# Patient Record
Sex: FEMALE | Race: ASIAN | NOT HISPANIC OR LATINO
[De-identification: names, ages, dates, MRNs, and addresses within clinical notes are randomized per-mention and may not be internally consistent; named-entity substitution may affect disease eponyms.]

---

## 2022-10-18 PROBLEM — Z00.00 ENCOUNTER FOR PREVENTIVE HEALTH EXAMINATION: Status: ACTIVE | Noted: 2022-10-18

## 2022-12-19 ENCOUNTER — NON-APPOINTMENT (OUTPATIENT)
Age: 47
End: 2022-12-19

## 2022-12-19 ENCOUNTER — APPOINTMENT (OUTPATIENT)
Dept: COLORECTAL SURGERY | Facility: CLINIC | Age: 47
End: 2022-12-19

## 2022-12-19 VITALS
RESPIRATION RATE: 15 BRPM | DIASTOLIC BLOOD PRESSURE: 78 MMHG | WEIGHT: 150 LBS | HEART RATE: 81 BPM | SYSTOLIC BLOOD PRESSURE: 118 MMHG | BODY MASS INDEX: 27.6 KG/M2 | TEMPERATURE: 97.3 F | HEIGHT: 62 IN

## 2022-12-19 DIAGNOSIS — K64.4 RESIDUAL HEMORRHOIDAL SKIN TAGS: ICD-10-CM

## 2022-12-19 PROCEDURE — 46600 DIAGNOSTIC ANOSCOPY SPX: CPT

## 2022-12-19 PROCEDURE — 99203 OFFICE O/P NEW LOW 30 MIN: CPT | Mod: 25

## 2022-12-19 NOTE — ASSESSMENT
[FreeTextEntry1] : 47 year old female who presents for evaluation of hemorrhoids. Patient is asymptomatic with no significant findings on exam. Patient knows to call should hemorrhoid(s) become bothersome/symptomatic, she will otherwise follow up as needed.

## 2022-12-19 NOTE — HISTORY OF PRESENT ILLNESS
[FreeTextEntry1] : 47 year old female who presents on referral for evaluation of hemorrhoids. Patient reports having a routine screening colonoscopy 1 month ago and was told she had hemorrhoids. She denies ever having symptoms, denies bright red blood per rectum, denies changes in stool size/caliber, denies any swollen or tender hemorrhoids.

## 2022-12-19 NOTE — PHYSICAL EXAM
[Excoriation] : no perianal excoriation [Fistula] : no fistulas [Wart] : no warts [Tender, Swollen] : nontender, non-swollen [Normal] : was normal [None] : there was no rectal mass  [Alert] : alert [Oriented to Person] : oriented to person [Oriented to Place] : oriented to place [Oriented to Time] : oriented to time [Calm] : calm [de-identified] : ROMEL: Nontender, no gross blood, no enlarged hemorrhoids [de-identified] : Small left external hemorrhoid [de-identified] : NAD [de-identified] : Nonlabored [de-identified] : Normal rate

## 2022-12-19 NOTE — PROCEDURE
[FreeTextEntry1] : Anoscopy\par \par I discussed the reason for the procedure, and the risks and benefits with the patient. Risks including bleeding and discomfort were discussed. The patient understood or discussion and would like to proceed with the procedure.\par \par After completing a digital rectal exam a well lubricated anoscope was gently inserted into the anus. Complete inspection was performed. No tenderness on insertion of the anoscope, and the procedure was tolerated well. No fissures, fistula openings, enlarged hemorrhoids or masses were identified.\par

## 2024-11-04 ENCOUNTER — OFFICE (OUTPATIENT)
Dept: URBAN - METROPOLITAN AREA CLINIC 12 | Facility: CLINIC | Age: 49
Setting detail: OPHTHALMOLOGY
End: 2024-11-04
Payer: COMMERCIAL

## 2024-11-04 DIAGNOSIS — H16.223: ICD-10-CM

## 2024-11-04 DIAGNOSIS — H40.033: ICD-10-CM

## 2024-11-04 PROCEDURE — 92133 CPTRZD OPH DX IMG PST SGM ON: CPT | Performed by: STUDENT IN AN ORGANIZED HEALTH CARE EDUCATION/TRAINING PROGRAM

## 2024-11-04 PROCEDURE — 99203 OFFICE O/P NEW LOW 30 MIN: CPT | Performed by: STUDENT IN AN ORGANIZED HEALTH CARE EDUCATION/TRAINING PROGRAM

## 2024-11-04 PROCEDURE — 92020 GONIOSCOPY: CPT | Performed by: STUDENT IN AN ORGANIZED HEALTH CARE EDUCATION/TRAINING PROGRAM

## 2024-11-04 ASSESSMENT — KERATOMETRY
OD_K1POWER_DIOPTERS: 41.25
OS_K1POWER_DIOPTERS: 42.75
OS_K2POWER_DIOPTERS: 43.00
OD_AXISANGLE_DEGREES: 078
OS_AXISANGLE_DEGREES: 096
OD_K2POWER_DIOPTERS: 42.25

## 2024-11-04 ASSESSMENT — TONOMETRY
OD_IOP_MMHG: 16
OS_IOP_MMHG: 13

## 2024-11-04 ASSESSMENT — REFRACTION_AUTOREFRACTION
OD_AXIS: 163
OD_SPHERE: -0.25
OD_CYLINDER: -0.25
OS_CYLINDER: -0.25
OS_SPHERE: +0.75
OS_AXIS: 120

## 2024-11-04 ASSESSMENT — CONFRONTATIONAL VISUAL FIELD TEST (CVF)
OS_FINDINGS: FULL
OD_FINDINGS: FULL

## 2024-11-04 ASSESSMENT — REFRACTION_MANIFEST
OD_AXIS: 163
OS_AXIS: 120
OD_CYLINDER: -0.25
OS_CYLINDER: -0.25
OD_SPHERE: -0.25
OS_SPHERE: +0.75

## 2024-11-04 ASSESSMENT — SUPERFICIAL PUNCTATE KERATITIS (SPK)
OS_SPK: 1+
OD_SPK: 1+

## 2024-11-04 ASSESSMENT — VISUAL ACUITY
OD_BCVA: 20/25
OS_BCVA: 20/25+

## 2024-12-09 ENCOUNTER — OFFICE (OUTPATIENT)
Dept: URBAN - METROPOLITAN AREA CLINIC 12 | Facility: CLINIC | Age: 49
Setting detail: OPHTHALMOLOGY
End: 2024-12-09
Payer: COMMERCIAL

## 2024-12-09 DIAGNOSIS — H16.223: ICD-10-CM

## 2024-12-09 DIAGNOSIS — H53.002: ICD-10-CM

## 2024-12-09 DIAGNOSIS — H40.033: ICD-10-CM

## 2024-12-09 PROCEDURE — 92083 EXTENDED VISUAL FIELD XM: CPT | Performed by: STUDENT IN AN ORGANIZED HEALTH CARE EDUCATION/TRAINING PROGRAM

## 2024-12-09 PROCEDURE — 99214 OFFICE O/P EST MOD 30 MIN: CPT | Performed by: STUDENT IN AN ORGANIZED HEALTH CARE EDUCATION/TRAINING PROGRAM

## 2024-12-09 ASSESSMENT — CONFRONTATIONAL VISUAL FIELD TEST (CVF)
OS_FINDINGS: FULL
OD_FINDINGS: FULL

## 2024-12-09 ASSESSMENT — SUPERFICIAL PUNCTATE KERATITIS (SPK)
OS_SPK: 1+
OD_SPK: 1+

## 2024-12-09 ASSESSMENT — TONOMETRY
OD_IOP_MMHG: 16
OS_IOP_MMHG: 16

## 2024-12-09 ASSESSMENT — DRY EYES - PHYSICIAN NOTES
OD_GENERALCOMMENTS: INFERIOR
OS_GENERALCOMMENTS: INFERIOR

## 2024-12-12 ASSESSMENT — REFRACTION_MANIFEST
OD_CYLINDER: -0.25
OS_CYLINDER: -0.25
OS_AXIS: 120
OD_AXIS: 163
OS_SPHERE: +0.75
OD_SPHERE: -0.25

## 2024-12-12 ASSESSMENT — REFRACTION_AUTOREFRACTION
OD_CYLINDER: -0.25
OS_CYLINDER: -0.25
OS_SPHERE: +1.00
OD_SPHERE: -0.25
OS_AXIS: 171
OD_AXIS: 15

## 2024-12-12 ASSESSMENT — KERATOMETRY
OD_K1POWER_DIOPTERS: 41.25
OS_AXISANGLE_DEGREES: 137
OD_AXISANGLE_DEGREES: 88
OD_K2POWER_DIOPTERS: 42.25
OS_K1POWER_DIOPTERS: 42.50
OS_K2POWER_DIOPTERS: 42.75

## 2024-12-12 ASSESSMENT — VISUAL ACUITY
OD_BCVA: 20/40
OS_BCVA: 20/25

## 2024-12-23 ENCOUNTER — OFFICE (OUTPATIENT)
Dept: URBAN - METROPOLITAN AREA CLINIC 12 | Facility: CLINIC | Age: 49
Setting detail: OPHTHALMOLOGY
End: 2024-12-23
Payer: COMMERCIAL

## 2024-12-23 DIAGNOSIS — H40.031: ICD-10-CM

## 2024-12-23 PROBLEM — H53.002 AMBLYOPIA; LEFT EYE: Status: ACTIVE | Noted: 2024-12-09

## 2024-12-23 PROCEDURE — 66761 REVISION OF IRIS: CPT | Mod: RT | Performed by: STUDENT IN AN ORGANIZED HEALTH CARE EDUCATION/TRAINING PROGRAM

## 2024-12-23 ASSESSMENT — VISUAL ACUITY
OD_BCVA: 20/30
OS_BCVA: 20/20-2

## 2024-12-23 ASSESSMENT — TONOMETRY
OD_IOP_MMHG: 12
OS_IOP_MMHG: 14

## 2024-12-23 ASSESSMENT — SUPERFICIAL PUNCTATE KERATITIS (SPK)
OS_SPK: 1+
OD_SPK: 1+

## 2024-12-23 ASSESSMENT — KERATOMETRY
OD_K1POWER_DIOPTERS: 41.25
OS_K1POWER_DIOPTERS: 42.75
METHOD_AUTO_MANUAL: AUTO
OS_AXISANGLE_DEGREES: 090
OD_K2POWER_DIOPTERS: 42.00
OD_AXISANGLE_DEGREES: 063
OS_K2POWER_DIOPTERS: 42.75

## 2024-12-23 ASSESSMENT — REFRACTION_MANIFEST
OD_SPHERE: -0.25
OS_CYLINDER: -0.25
OD_AXIS: 163
OS_AXIS: 120
OD_CYLINDER: -0.25
OS_SPHERE: +0.75

## 2024-12-23 ASSESSMENT — REFRACTION_AUTOREFRACTION
OS_CYLINDER: -0.25
OS_AXIS: 165
OS_SPHERE: +1.25
OD_SPHERE: PLANO
OD_AXIS: 000
OD_CYLINDER: SPHERE

## 2024-12-23 ASSESSMENT — CONFRONTATIONAL VISUAL FIELD TEST (CVF)
OD_FINDINGS: FULL
OS_FINDINGS: FULL

## 2024-12-23 ASSESSMENT — DRY EYES - PHYSICIAN NOTES
OS_GENERALCOMMENTS: INFERIOR
OD_GENERALCOMMENTS: INFERIOR

## 2025-01-13 ENCOUNTER — OFFICE (OUTPATIENT)
Dept: URBAN - METROPOLITAN AREA CLINIC 12 | Facility: CLINIC | Age: 50
Setting detail: OPHTHALMOLOGY
End: 2025-01-13
Payer: COMMERCIAL

## 2025-01-13 DIAGNOSIS — H40.032: ICD-10-CM

## 2025-01-13 PROCEDURE — 66761 REVISION OF IRIS: CPT | Mod: LT | Performed by: STUDENT IN AN ORGANIZED HEALTH CARE EDUCATION/TRAINING PROGRAM

## 2025-01-13 ASSESSMENT — VISUAL ACUITY
OS_BCVA: 20/25-1
OD_BCVA: 20/30-2

## 2025-01-13 ASSESSMENT — REFRACTION_MANIFEST
OS_CYLINDER: -0.25
OD_SPHERE: -0.25
OD_AXIS: 163
OS_SPHERE: +0.75
OD_CYLINDER: -0.25
OS_AXIS: 120

## 2025-01-13 ASSESSMENT — TONOMETRY
OD_IOP_MMHG: 14
OS_IOP_MMHG: 16

## 2025-01-13 ASSESSMENT — SUPERFICIAL PUNCTATE KERATITIS (SPK)
OD_SPK: 1+
OS_SPK: 1+

## 2025-01-13 ASSESSMENT — CONFRONTATIONAL VISUAL FIELD TEST (CVF)
OS_FINDINGS: FULL
OD_FINDINGS: FULL

## 2025-01-13 ASSESSMENT — KERATOMETRY
OD_K2POWER_DIOPTERS: 42.25
OD_K1POWER_DIOPTERS: 41.25
OD_AXISANGLE_DEGREES: 079
OS_AXISANGLE_DEGREES: 102
OS_K1POWER_DIOPTERS: 42.75
METHOD_AUTO_MANUAL: AUTO
OS_K2POWER_DIOPTERS: 43.00

## 2025-01-13 ASSESSMENT — REFRACTION_AUTOREFRACTION
OS_CYLINDER: -0.25
OD_SPHERE: PLANO
OS_AXIS: 145
OS_SPHERE: +1.00
OD_AXIS: 008
OD_CYLINDER: -0.25

## 2025-01-13 ASSESSMENT — DRY EYES - PHYSICIAN NOTES
OS_GENERALCOMMENTS: INFERIOR
OD_GENERALCOMMENTS: INFERIOR

## 2025-01-27 ENCOUNTER — OFFICE (OUTPATIENT)
Dept: URBAN - METROPOLITAN AREA CLINIC 12 | Facility: CLINIC | Age: 50
Setting detail: OPHTHALMOLOGY
End: 2025-01-27
Payer: COMMERCIAL

## 2025-01-27 DIAGNOSIS — H40.033: ICD-10-CM

## 2025-01-27 PROCEDURE — 99024 POSTOP FOLLOW-UP VISIT: CPT | Performed by: STUDENT IN AN ORGANIZED HEALTH CARE EDUCATION/TRAINING PROGRAM

## 2025-01-27 ASSESSMENT — REFRACTION_MANIFEST
OS_CYLINDER: -0.25
OD_AXIS: 163
OS_AXIS: 120
OD_CYLINDER: -0.25
OD_SPHERE: -0.25
OS_SPHERE: +0.75

## 2025-01-27 ASSESSMENT — REFRACTION_AUTOREFRACTION
OS_SPHERE: +0.75
OD_AXIS: 017
OD_CYLINDER: -0.25
OS_AXIS: 007
OS_CYLINDER: -0.25
OD_SPHERE: -0.25

## 2025-01-27 ASSESSMENT — KERATOMETRY
OD_K1POWER_DIOPTERS: 41.25
OS_AXISANGLE_DEGREES: 095
OS_K2POWER_DIOPTERS: 43.25
METHOD_AUTO_MANUAL: AUTO
OD_AXISANGLE_DEGREES: 085
OS_K1POWER_DIOPTERS: 42.75
OD_K2POWER_DIOPTERS: 42.50

## 2025-01-27 ASSESSMENT — CONFRONTATIONAL VISUAL FIELD TEST (CVF)
OS_FINDINGS: FULL
OD_FINDINGS: FULL

## 2025-01-27 ASSESSMENT — VISUAL ACUITY
OS_BCVA: 20/25-1
OD_BCVA: 20/25-2

## 2025-01-27 ASSESSMENT — REFRACTION_CURRENTRX
OD_ADD: +2.50
OS_ADD: +2.50
OD_VPRISM_DIRECTION: SV
OD_OVR_VA: 20/
OS_OVR_VA: 20/
OS_VPRISM_DIRECTION: SV

## 2025-01-27 ASSESSMENT — TONOMETRY
OS_IOP_MMHG: 13
OD_IOP_MMHG: 11

## 2025-01-27 ASSESSMENT — DRY EYES - PHYSICIAN NOTES
OS_GENERALCOMMENTS: INFERIOR
OD_GENERALCOMMENTS: INFERIOR

## 2025-01-27 ASSESSMENT — SUPERFICIAL PUNCTATE KERATITIS (SPK)
OD_SPK: 1+
OS_SPK: 1+

## 2025-06-23 ENCOUNTER — RX ONLY (RX ONLY)
Age: 50
End: 2025-06-23

## 2025-06-23 ENCOUNTER — OFFICE (OUTPATIENT)
Dept: URBAN - METROPOLITAN AREA CLINIC 12 | Facility: CLINIC | Age: 50
Setting detail: OPHTHALMOLOGY
End: 2025-06-23
Payer: COMMERCIAL

## 2025-06-23 DIAGNOSIS — H40.033: ICD-10-CM

## 2025-06-23 DIAGNOSIS — H16.223: ICD-10-CM

## 2025-06-23 PROCEDURE — 92083 EXTENDED VISUAL FIELD XM: CPT | Performed by: STUDENT IN AN ORGANIZED HEALTH CARE EDUCATION/TRAINING PROGRAM

## 2025-06-23 PROCEDURE — 99213 OFFICE O/P EST LOW 20 MIN: CPT | Performed by: STUDENT IN AN ORGANIZED HEALTH CARE EDUCATION/TRAINING PROGRAM

## 2025-06-23 PROCEDURE — 92133 CPTRZD OPH DX IMG PST SGM ON: CPT | Performed by: STUDENT IN AN ORGANIZED HEALTH CARE EDUCATION/TRAINING PROGRAM

## 2025-06-23 ASSESSMENT — REFRACTION_CURRENTRX
OD_SPHERE: +2.50
OD_VPRISM_DIRECTION: SV
OS_SPHERE: +2.50
OS_OVR_VA: 20/
OS_VPRISM_DIRECTION: SV
OD_OVR_VA: 20/

## 2025-06-23 ASSESSMENT — REFRACTION_MANIFEST
OD_CYLINDER: -0.25
OS_CYLINDER: -0.25
OD_SPHERE: -0.25
OS_AXIS: 120
OD_AXIS: 163
OS_SPHERE: +0.75

## 2025-06-23 ASSESSMENT — KERATOMETRY
OS_K2POWER_DIOPTERS: 43.00
METHOD_AUTO_MANUAL: AUTO
OS_K1POWER_DIOPTERS: 42.75
OD_AXISANGLE_DEGREES: 082
OS_AXISANGLE_DEGREES: 082
OD_K1POWER_DIOPTERS: 41.50
OD_K2POWER_DIOPTERS: 42.25

## 2025-06-23 ASSESSMENT — SUPERFICIAL PUNCTATE KERATITIS (SPK)
OS_SPK: 1+
OD_SPK: 1+

## 2025-06-23 ASSESSMENT — CONFRONTATIONAL VISUAL FIELD TEST (CVF)
OS_FINDINGS: FULL
OD_FINDINGS: FULL

## 2025-06-23 ASSESSMENT — REFRACTION_AUTOREFRACTION
OS_SPHERE: +1.00
OD_SPHERE: PLANO
OS_AXIS: 152
OD_CYLINDER: -0.25
OD_AXIS: 180
OS_CYLINDER: -0.50

## 2025-06-23 ASSESSMENT — TONOMETRY
OS_IOP_MMHG: 15
OD_IOP_MMHG: 17

## 2025-06-23 ASSESSMENT — VISUAL ACUITY
OS_BCVA: 20/25+2
OD_BCVA: 20/30-3

## 2025-06-23 ASSESSMENT — DRY EYES - PHYSICIAN NOTES
OS_GENERALCOMMENTS: INFERIOR
OD_GENERALCOMMENTS: INFERIOR

## 2025-08-11 ENCOUNTER — OFFICE (OUTPATIENT)
Dept: URBAN - METROPOLITAN AREA CLINIC 12 | Facility: CLINIC | Age: 50
Setting detail: OPHTHALMOLOGY
End: 2025-08-11
Payer: COMMERCIAL

## 2025-08-11 DIAGNOSIS — H16.223: ICD-10-CM

## 2025-08-11 PROCEDURE — 99213 OFFICE O/P EST LOW 20 MIN: CPT | Performed by: STUDENT IN AN ORGANIZED HEALTH CARE EDUCATION/TRAINING PROGRAM

## 2025-08-11 ASSESSMENT — REFRACTION_MANIFEST
OS_CYLINDER: -0.25
OS_SPHERE: +0.75
OD_CYLINDER: -0.25
OD_AXIS: 163
OS_AXIS: 120
OD_SPHERE: -0.25

## 2025-08-11 ASSESSMENT — KERATOMETRY
OS_AXISANGLE_DEGREES: 097
OD_AXISANGLE_DEGREES: 081
METHOD_AUTO_MANUAL: AUTO
OD_K2POWER_DIOPTERS: 42.25
OS_K1POWER_DIOPTERS: 42.75
OS_K2POWER_DIOPTERS: 43.00
OD_K1POWER_DIOPTERS: 41.25

## 2025-08-11 ASSESSMENT — REFRACTION_CURRENTRX
OS_ADD: +2.75
OD_ADD: +2.75
OS_OVR_VA: 20/
OD_VPRISM_DIRECTION: SV
OS_SPHERE: +2.50
OD_OVR_VA: 20/
OS_VPRISM_DIRECTION: SV
OS_OVR_VA: 20/
OD_OVR_VA: 20/
OD_SPHERE: +2.50

## 2025-08-11 ASSESSMENT — REFRACTION_AUTOREFRACTION
OS_CYLINDER: -0.50
OD_SPHERE: PLANO
OD_CYLINDER: SPHERE
OS_AXIS: 170
OS_SPHERE: +1.00
OD_AXIS: 000

## 2025-08-11 ASSESSMENT — SUPERFICIAL PUNCTATE KERATITIS (SPK)
OS_SPK: 1+
OD_SPK: 1+

## 2025-08-11 ASSESSMENT — VISUAL ACUITY
OD_BCVA: 20/40-
OS_BCVA: 20/20-2

## 2025-08-11 ASSESSMENT — CONFRONTATIONAL VISUAL FIELD TEST (CVF)
OS_FINDINGS: FULL
OD_FINDINGS: FULL

## 2025-08-11 ASSESSMENT — DRY EYES - PHYSICIAN NOTES
OS_GENERALCOMMENTS: INFERIOR
OD_GENERALCOMMENTS: INFERIOR